# Patient Record
Sex: FEMALE | Race: WHITE | NOT HISPANIC OR LATINO | Employment: FULL TIME | URBAN - METROPOLITAN AREA
[De-identification: names, ages, dates, MRNs, and addresses within clinical notes are randomized per-mention and may not be internally consistent; named-entity substitution may affect disease eponyms.]

---

## 2024-09-06 ENCOUNTER — LAB REQUISITION (OUTPATIENT)
Dept: LAB | Facility: CLINIC | Age: 64
End: 2024-09-06

## 2024-09-06 LAB
PATH REPORT.COMMENTS IMP SPEC: NORMAL
PATH REPORT.FINAL DX SPEC: NORMAL
PATH REPORT.GROSS SPEC: NORMAL
PATH REPORT.MICROSCOPIC SPEC OTHER STN: NORMAL
PATH REPORT.RELEVANT HX SPEC: NORMAL
PATH REPORT.RELEVANT HX SPEC: NORMAL
PATH REPORT.SITE OF ORIGIN SPEC: NORMAL

## 2024-09-09 ENCOUNTER — PATIENT OUTREACH (OUTPATIENT)
Dept: ONCOLOGY | Facility: CLINIC | Age: 64
End: 2024-09-09
Payer: COMMERCIAL

## 2024-09-09 DIAGNOSIS — N85.02 EIN (ENDOMETRIAL INTRAEPITHELIAL NEOPLASIA): Primary | ICD-10-CM

## 2024-09-09 NOTE — PROGRESS NOTES
New Patient Hematology / Oncology Nurse Navigator Note     Referral Date: 9/9/24    Referring provider: Dr MONSTER Garcia     Referring Clinic/Organization: [facility]      Referred to: Gynecology Oncology    Requested provider (if applicable): Dr. Henao    Evaluation for :      Clinical History (per Nurse review of records provided):    9/6/24 path report -- BOOKMARKED  9/9/24 telephone encounter with referring provider -- BOOKMARKED    Clinical Assessment / Barriers to Care (Per Nurse):    ALE - left generic voicemail with call back information       Records Location: Care Everywhere     Records Needed:     See Pre-Visit encounter from CSS team for additional details on records collection. Additional questions on records needed for initial consult can be sent to P ONC ADULT NEW PATIENT RECORDS [[id number]] with a copy to  CANCER CARE NURSE NAVIGATION [[id number]]. Please include diagnosis and urgency in subject line.    Additional testing needed prior to consult:     N/A    Referral updates and Plan:     Triage instructions updated and sent to NPS for completion, VM left for patient      Aspen Freeman, JESN, RN, PHN, OCN  Hematology/Oncology Nurse Navigator   [facility]   [phone]   [email or web address]

## 2024-09-24 NOTE — TELEPHONE ENCOUNTER
RECORDS STATUS - ALL OTHER DIAGNOSIS      RECORDS RECEIVED FROM: [facility]   DATE RECEIVED: 9/25   NOTES STATUS DETAILS   OFFICE NOTE from referring provider [facility] Dr. Gilmar Kendall: 6/27/24   OPERATIVE REPORT [facility] 8/30/24: Hysteroscopy    11/10/17: Colonoscopy   MEDICATION LIST [facility]   LABS     PATHOLOGY REPORTS [facility]  9/6/24: Path Consult - CASE FROM [place], [place] ([id number], OBTAINED 08/30/24)    [facility]  8/30/24: [id number]  2/26/1998: [id number]  6/13/1996: [id number]  10/25/1995: [id number]    ANYTHING RELATED TO DIAGNOSIS Epic/ 9/20/24   IMAGING (NEED IMAGES & REPORT)     ULTRASOUND PACS 4/4/24: [facility]

## 2024-09-27 ENCOUNTER — ONCOLOGY VISIT (OUTPATIENT)
Dept: ONCOLOGY | Facility: CLINIC | Age: 64
End: 2024-09-27
Attending: OBSTETRICS & GYNECOLOGY
Payer: COMMERCIAL

## 2024-09-27 ENCOUNTER — PRE VISIT (OUTPATIENT)
Dept: ONCOLOGY | Facility: CLINIC | Age: 64
End: 2024-09-27
Payer: COMMERCIAL

## 2024-09-27 VITALS
HEART RATE: 69 BPM | OXYGEN SATURATION: 99 % | SYSTOLIC BLOOD PRESSURE: 134 MMHG | RESPIRATION RATE: 16 BRPM | HEIGHT: 63 IN | WEIGHT: 284 LBS | BODY MASS INDEX: 50.32 KG/M2 | DIASTOLIC BLOOD PRESSURE: 91 MMHG | TEMPERATURE: 97.4 F

## 2024-09-27 DIAGNOSIS — N85.02 EIN (ENDOMETRIAL INTRAEPITHELIAL NEOPLASIA): ICD-10-CM

## 2024-09-27 PROCEDURE — 99213 OFFICE O/P EST LOW 20 MIN: CPT | Performed by: OBSTETRICS & GYNECOLOGY

## 2024-09-27 PROCEDURE — 93005 ELECTROCARDIOGRAM TRACING: CPT

## 2024-09-27 PROCEDURE — 99205 OFFICE O/P NEW HI 60 MIN: CPT | Performed by: OBSTETRICS & GYNECOLOGY

## 2024-09-27 PROCEDURE — 93010 ELECTROCARDIOGRAM REPORT: CPT | Performed by: INTERNAL MEDICINE

## 2024-09-27 RX ORDER — METRONIDAZOLE 500 MG/100ML
500 INJECTION, SOLUTION INTRAVENOUS
OUTPATIENT
Start: 2024-09-27

## 2024-09-27 RX ORDER — CEFAZOLIN SODIUM IN 0.9 % NACL 3 G/100 ML
3 INTRAVENOUS SOLUTION, PIGGYBACK (ML) INTRAVENOUS
OUTPATIENT
Start: 2024-09-27

## 2024-09-27 RX ORDER — CEFAZOLIN SODIUM IN 0.9 % NACL 3 G/100 ML
3 INTRAVENOUS SOLUTION, PIGGYBACK (ML) INTRAVENOUS SEE ADMIN INSTRUCTIONS
Status: DISCONTINUED | OUTPATIENT
Start: 2024-09-27 | End: 2024-09-27

## 2024-09-27 ASSESSMENT — PAIN SCALES - GENERAL: PAINLEVEL: NO PAIN (0)

## 2024-09-27 NOTE — LETTER
2024      Dayna Chaudhary  [address]  [facility] [address]      Dear Colleague,    Thank you for referring your patient, Dayna Chaudhary, to the [facility]. Please see a copy of my visit note below.    Gynecologic Oncology New Patient Consult    Referring provider:    Referred Self, MD  No address on file   RE: Dayna Chaudhary  : 1960  ROXANA: 2024      CC: Endometrial Intraepithelial Neoplasia    HPI: Ms Dayna Chaudhary is a 63 year old year old female who presents for consultation. She is here today alone.        Treatment History:  2024: PMB x 2 episodes.   2024: TVUS Stripe 8mm  2024: OBGYN visit. Attempted embx but had cervical stenosis.   24: D&C with EIN    As above, has had 2 small episodes of PMB, which was light spotting. Light cramping/fullness. Had a D&C (due to cervical stenosis) which showed EIN.   no other symptoms. Some weight gain over the past couple of years.     Has a history of DVT/PTE in  while on OCPs (and had an ankle fracture). Not on anticoagulation now.     Great functional status, no SOB with ambulation.       Past Medical History:   Diagnosis Date     Ankle fracture      DVT (deep venous thrombosis) (H)     in  at the time of ankle fracture. DVT/PE     EIN (endometrial intraepithelial neoplasia)      Obesity      Pulmonary embolism (H)     DVT/PE in  while on OCPS and had broken ankle       Past Surgical History:   Procedure Laterality Date     ANKLE ARTHROPLASTY       CONIZATION LEEP      multiple.     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    General: Alert and oriented, no acute distress  Psych: Mood stable. Linear speech, appropriate affect  CV: RRR  Lungs: CTA  GI: No distention. No masses. No hernia.   Lymph: No enlarge lymph nodes in neck or groin  : Normal external genitalia. Cervix small. Uterus difficult to assess given habitus. No obvious pelvic masses.   ECOG 0      Labs/Path:     Collection Date:          8/30/2024 10:26 CDT      Ordering Physician:       JAVAN LISA   Received Date:            8/30/2024 10:51 CDT      Accession Number:         [id number]                                          Surgical Pathology Final Report   Specimen Type:   A. Endometrium, biopsy   B. Endocervix, curettage       Final Diagnosis:   Final diagnosis by Dr. Smith:     A. Endometrium, biopsy -     - Atypical endometrial hyperplasia (Endometrial Intraepithelial Neoplasia,    EIN)   - Please see comment     B. Endocervix, curettage -     - Unremarkable ectocervical squamous mucosa   - Negative for dysplasia or malignancy     *  Report Electronically Signed By  *      Bao Torrez M.D.      09.06.2024 16:51     Assessment:    Dayna Chaudhary is a 63 year old woman with a new diagnosis of EIN.          Plan:     1.)   Endometrial intraepithelial Neoplasia " (EIN): Reviewed pathology and natural history of EIN. Discussed up to 40% risk of underlying malignancy with EIN. Discussed that gold standard is definitive hysterectomy. We also reviewed options for hormonal non surgical treatment. I recommend surgery including Robotic TLH/BSO/possible sentinel LND. Discussed surgical risks not limited to bleeding, infection, damage to surrounding organs, need for blood transfusions and ICU stay. Discussed role of intraoperative frozen section. Discussed that if a malignancy is encountered and she meets criteria for staging, I would attempt sentinel LND versus full lymphadenectomy.   Not eligible for megace/metformin study given DVT history.     -Dacia TLH/BSO/sentinel LND  -CBC/CMP/EKG today in clinic, no additional preop needed       2.)Genetic risk factors were assessed: final pathology pending        Total visit time today was 65 minutes, which included review of labs, personally reviewing images , reviewing outside records,  , face to face time with the patient,  Documentation, and ordering labs and procedures .       Yesica Henao MD    Department of Ob/Gyn and Women's Health  Division of Gynecologic Oncology  [facility]  Pager [phone]          Again, thank you for allowing me to participate in the care of your patient.        Sincerely,        Yesica Henao MD

## 2024-09-27 NOTE — PROGRESS NOTES
Gynecologic Oncology New Patient Consult    Referring provider:    Referred Self, MD  No address on file   RE: Dayna Chaudhary  : 1960  ROXANA: 2024      CC: Endometrial Intraepithelial Neoplasia    HPI: Ms Dayna Chaudhary is a 63 year old year old female who presents for consultation. She is here today alone.        Treatment History:  2024: PMB x 2 episodes.   2024: TVUS Stripe 8mm  2024: OBGYN visit. Attempted embx but had cervical stenosis.   24: D&C with EIN    As above, has had 2 small episodes of PMB, which was light spotting. Light cramping/fullness. Had a D&C (due to cervical stenosis) which showed EIN.   no other symptoms. Some weight gain over the past couple of years.     Has a history of DVT/PTE in  while on OCPs (and had an ankle fracture). Not on anticoagulation now.     Great functional status, no SOB with ambulation.       Past Medical History:   Diagnosis Date    Ankle fracture     DVT (deep venous thrombosis) (H)     in  at the time of ankle fracture. DVT/PE    EIN (endometrial intraepithelial neoplasia)     Obesity     Pulmonary embolism (H)     DVT/PE in  while on OCPS and had broken ankle       Past Surgical History:   Procedure Laterality Date    ANKLE ARTHROPLASTY      CONIZATION LEEP      multiple.    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    General: Alert and oriented, no acute distress  Psych: Mood stable. Linear speech, appropriate affect  CV: RRR  Lungs: CTA  GI: No distention. No masses. No hernia.   Lymph: No enlarge lymph nodes in neck or groin  : Normal external genitalia. Cervix small. Uterus difficult to assess given habitus. No obvious pelvic masses.   ECOG 0      Labs/Path:     Collection Date:          8/30/2024 10:26 CDT      Ordering Physician:       JAVAN LISA   Received Date:            8/30/2024 10:51 CDT      Accession Number:         [id number]                                          Surgical Pathology Final Report   Specimen Type:   A. Endometrium, biopsy   B. Endocervix, curettage       Final Diagnosis:   Final diagnosis by Dr. Smith:     A. Endometrium, biopsy -     - Atypical endometrial hyperplasia (Endometrial Intraepithelial Neoplasia,    EIN)   - Please see comment     B. Endocervix, curettage -     - Unremarkable ectocervical squamous mucosa   - Negative for dysplasia or malignancy     *  Report Electronically Signed By  *      Bao Torrez M.D.      09.06.2024 16:51     Assessment:    Dayna Chaudhary is a 63 year old woman with a new diagnosis of EIN.          Plan:     1.)   Endometrial intraepithelial Neoplasia (EIN): Reviewed pathology and natural history of EIN. Discussed up to 40% risk of underlying malignancy with EIN. Discussed that gold standard is definitive hysterectomy. We also reviewed options for hormonal non surgical treatment. I recommend surgery including " Robotic TLH/BSO/possible sentinel LND. Discussed surgical risks not limited to bleeding, infection, damage to surrounding organs, need for blood transfusions and ICU stay. Discussed role of intraoperative frozen section. Discussed that if a malignancy is encountered and she meets criteria for staging, I would attempt sentinel LND versus full lymphadenectomy.   Not eligible for megace/metformin study given DVT history.     -Dacia TLH/BSO/sentinel LND  -CBC/CMP/EKG today in clinic, no additional preop needed       2.)Genetic risk factors were assessed: final pathology pending        Total visit time today was 65 minutes, which included review of labs, personally reviewing images , reviewing outside records,  , face to face time with the patient,  Documentation, and ordering labs and procedures .       Yesica Henao MD    Department of Ob/Gyn and Women's Health  Division of Gynecologic Oncology  [facility]  Pager [phone]

## 2024-09-27 NOTE — NURSING NOTE
"Oncology Rooming Note    September 27, 2024 2:49 PM   Dayna Chaudhary is a 63 year old female who presents for:    Chief Complaint   Patient presents with    Oncology Clinic Visit     EIN (endometrial intraepithelial neoplasia)     Initial Vitals: BP (!) 134/91 (BP Location: Right arm, Patient Position: Sitting, Cuff Size: Adult Large)   Pulse 69   Temp 97.4  F (36.3  C) (Oral)   Resp 16   Ht 1.611 m (5' 3.43\")   Wt 128.8 kg (284 lb)   SpO2 99%   BMI 49.64 kg/m   Estimated body mass index is 49.64 kg/m  as calculated from the following:    Height as of this encounter: 1.611 m (5' 3.43\").    Weight as of this encounter: 128.8 kg (284 lb). Body surface area is 2.4 meters squared.  No Pain (0) Comment: Data Unavailable   No LMP recorded. Patient is postmenopausal.  Allergies reviewed: Yes  Medications reviewed: Yes    Medications: Medication refills not needed today.  Pharmacy name entered into [employer]: [id number] [address] AT [facility]    Frailty Screening:   Is the patient here for a new oncology consult visit in cancer care? 1. Yes. Over the past month, have you experienced difficulty or required a caregiver to assist with:   1. Balance, walking or general mobility (including any falls)? NO  2. Completion of self-care tasks such as bathing, dressing, toileting, grooming/hygiene?  NO  3. Concentration or memory that affects your daily life?  NO       Clinical concerns:  none      Maryanne Zarco"

## 2024-09-27 NOTE — NURSING NOTE
EKG was performed today per order written by Dr thompson.  Name and  verified with patient. Patient tolerated well without incident. File transmitted to chart.    Lashell Murguia LPN   on 2024 at 5:06 PM

## 2024-09-27 NOTE — NURSING NOTE
RN completed pre operative teaching      Reviewed:  What to expect with surgery  Incison care  Restrictions  Diet after  Symptoms of concern  Driving  Bowel care   Showering after  Pain management and expectations     RN answered all the patients questions to the best of her ability    Maxine Chaney RN

## 2024-09-30 LAB
ATRIAL RATE - MUSE: 66 BPM
DIASTOLIC BLOOD PRESSURE - MUSE: NORMAL MMHG
INTERPRETATION ECG - MUSE: NORMAL
P AXIS - MUSE: 58 DEGREES
PR INTERVAL - MUSE: 202 MS
QRS DURATION - MUSE: 94 MS
QT - MUSE: 394 MS
QTC - MUSE: 413 MS
R AXIS - MUSE: -1 DEGREES
SYSTOLIC BLOOD PRESSURE - MUSE: NORMAL MMHG
T AXIS - MUSE: 14 DEGREES
VENTRICULAR RATE- MUSE: 66 BPM

## 2024-10-03 ENCOUNTER — TELEPHONE (OUTPATIENT)
Dept: ONCOLOGY | Facility: CLINIC | Age: 64
End: 2024-10-03
Payer: COMMERCIAL

## 2024-10-03 NOTE — TELEPHONE ENCOUNTER
Spoke with patient    Patient is schedule for surgery with: Dr. Henao    Surgery Date: 11/7 (ok'd by Dr. Guzman to use block)     Location: [facility]    H&P: to be completed by: completed by surgeon will complete and/or update on day of surgery as needed    LABS will be completed at [facility] per patient    Post-op:  11/22, in-person visit,      Patient was informed that a hospital pre-op RN will call 2-3 days prior to surgery with arrival time and instructions: Yes     Patient aware times are subject to change up until day before surgery.     Patient questions/concerns:  Patient is asking if there are any restrictions on getting the flu & covid vaccine prior to procedure.         Patient will also need assistance with FMLA. Pt states that you can messge her through [employer].     Surgery packet was sent via US mail      Teri Gibson on 10/3/2024 at 11:22 AM

## 2024-10-10 ENCOUNTER — TELEPHONE (OUTPATIENT)
Dept: ONCOLOGY | Facility: CLINIC | Age: 64
End: 2024-10-10
Payer: COMMERCIAL

## 2024-10-10 NOTE — TELEPHONE ENCOUNTER
FMLA/STD forms received via [employer]ax from [employer].      Forms to be completed and put in folder for provider to approve.    Fax #:  [phone]  Claim: [id number]    Marii Calle

## 2024-10-10 NOTE — TELEPHONE ENCOUNTER
FMLA/STD forms filled out and put in providers folder for review and signature.      Marii Calle

## 2024-10-25 NOTE — TELEPHONE ENCOUNTER
FMLA/STD paperwork completed, checked for accuracy, signed and faxed to [employer] @ [phone]. A copy was made, sent to scanning and original mailed to patient at home address.    Successful transmission verified in Right Fax.    Conchita Reis

## 2024-10-29 ENCOUNTER — PATIENT OUTREACH (OUTPATIENT)
Dept: ONCOLOGY | Facility: CLINIC | Age: 64
End: 2024-10-29
Payer: COMMERCIAL

## 2024-10-29 NOTE — PROGRESS NOTES
Malorie from [facility] reached out     Malorie states that she needs CPT codes in order to process patients STD leave     Writer tried to explain to Malorie that they did not have access to CPT codes    Malorie was frustrated and stated that she did not have time for this and was going to simone it incomplete and send this back to Dr Henao's team     Writer explained that the clinic does paperwork all day long and have never had a claimed denied due to no CPT codes being on the paperwork     Malorie tried to verify the CPT code for the hysterectomy only and writer gently explained there were more then one code as patient is also having ovaries and fallopian tubes removed and potential lymph node biopsies. Writer does not want patient given less of a leave due to CPT codes not being correct     Malorie given billing/hospital number to better access the correct CPT code       Malorie was very frustrated and made it known she didn't have time for this     Writer apologized     Maxine Chaney RN

## 2024-11-06 ENCOUNTER — ANESTHESIA EVENT (OUTPATIENT)
Dept: SURGERY | Facility: CLINIC | Age: 64
End: 2024-11-06
Payer: COMMERCIAL

## 2024-11-07 ENCOUNTER — HOSPITAL ENCOUNTER (OUTPATIENT)
Facility: CLINIC | Age: 64
Discharge: HOME | End: 2024-11-07
Attending: OBSTETRICS & GYNECOLOGY | Admitting: OBSTETRICS & GYNECOLOGY
Payer: COMMERCIAL

## 2024-11-07 ENCOUNTER — ANESTHESIA (OUTPATIENT)
Dept: SURGERY | Facility: CLINIC | Age: 64
End: 2024-11-07
Payer: COMMERCIAL

## 2024-11-07 VITALS
DIASTOLIC BLOOD PRESSURE: 68 MMHG | SYSTOLIC BLOOD PRESSURE: 114 MMHG | HEIGHT: 63 IN | OXYGEN SATURATION: 97 % | WEIGHT: 280.87 LBS | TEMPERATURE: 97.4 F | HEART RATE: 66 BPM | BODY MASS INDEX: 49.77 KG/M2 | RESPIRATION RATE: 18 BRPM

## 2024-11-07 DIAGNOSIS — G89.18 POSTOPERATIVE PAIN: Primary | ICD-10-CM

## 2024-11-07 DIAGNOSIS — Z98.890 POST-OPERATIVE STATE: ICD-10-CM

## 2024-11-07 DIAGNOSIS — Z86.718 HISTORY OF DVT (DEEP VEIN THROMBOSIS): ICD-10-CM

## 2024-11-07 LAB
ABO/RH(D): NORMAL
ANTIBODY SCREEN: NEGATIVE
SPECIMEN EXPIRATION DATE: NORMAL

## 2024-11-07 PROCEDURE — 250N000011 HC RX IP 250 OP 636: Mod: JZ | Performed by: OBSTETRICS & GYNECOLOGY

## 2024-11-07 PROCEDURE — 258N000003 HC RX IP 258 OP 636: Performed by: STUDENT IN AN ORGANIZED HEALTH CARE EDUCATION/TRAINING PROGRAM

## 2024-11-07 PROCEDURE — 272N000001 HC OR GENERAL SUPPLY STERILE: Performed by: OBSTETRICS & GYNECOLOGY

## 2024-11-07 PROCEDURE — 250N000009 HC RX 250: Performed by: OBSTETRICS & GYNECOLOGY

## 2024-11-07 PROCEDURE — 360N000080 HC SURGERY LEVEL 7, PER MIN: Performed by: OBSTETRICS & GYNECOLOGY

## 2024-11-07 PROCEDURE — 250N000011 HC RX IP 250 OP 636: Performed by: NURSE ANESTHETIST, CERTIFIED REGISTERED

## 2024-11-07 PROCEDURE — 250N000009 HC RX 250: Performed by: NURSE ANESTHETIST, CERTIFIED REGISTERED

## 2024-11-07 PROCEDURE — 88331 PATH CONSLTJ SURG 1 BLK 1SPC: CPT | Mod: 26 | Performed by: PATHOLOGY

## 2024-11-07 PROCEDURE — 999N000141 HC STATISTIC PRE-PROCEDURE NURSING ASSESSMENT: Performed by: OBSTETRICS & GYNECOLOGY

## 2024-11-07 PROCEDURE — 258N000003 HC RX IP 258 OP 636: Performed by: REGISTERED NURSE

## 2024-11-07 PROCEDURE — 370N000017 HC ANESTHESIA TECHNICAL FEE, PER MIN: Performed by: OBSTETRICS & GYNECOLOGY

## 2024-11-07 PROCEDURE — 86901 BLOOD TYPING SEROLOGIC RH(D): CPT | Performed by: OBSTETRICS & GYNECOLOGY

## 2024-11-07 PROCEDURE — 250N000009 HC RX 250: Performed by: REGISTERED NURSE

## 2024-11-07 PROCEDURE — 250N000011 HC RX IP 250 OP 636

## 2024-11-07 PROCEDURE — 250N000013 HC RX MED GY IP 250 OP 250 PS 637: Performed by: STUDENT IN AN ORGANIZED HEALTH CARE EDUCATION/TRAINING PROGRAM

## 2024-11-07 PROCEDURE — 88331 PATH CONSLTJ SURG 1 BLK 1SPC: CPT | Mod: TC | Performed by: OBSTETRICS & GYNECOLOGY

## 2024-11-07 PROCEDURE — 88342 IMHCHEM/IMCYTCHM 1ST ANTB: CPT | Mod: 26 | Performed by: PATHOLOGY

## 2024-11-07 PROCEDURE — 88309 TISSUE EXAM BY PATHOLOGIST: CPT | Mod: 26 | Performed by: PATHOLOGY

## 2024-11-07 PROCEDURE — 710N000009 HC RECOVERY PHASE 1, LEVEL 1, PER MIN: Performed by: OBSTETRICS & GYNECOLOGY

## 2024-11-07 PROCEDURE — 250N000011 HC RX IP 250 OP 636: Performed by: OBSTETRICS & GYNECOLOGY

## 2024-11-07 PROCEDURE — 88341 IMHCHEM/IMCYTCHM EA ADD ANTB: CPT | Mod: 26 | Performed by: PATHOLOGY

## 2024-11-07 PROCEDURE — 250N000011 HC RX IP 250 OP 636: Performed by: ANESTHESIOLOGY

## 2024-11-07 PROCEDURE — 86900 BLOOD TYPING SEROLOGIC ABO: CPT | Performed by: OBSTETRICS & GYNECOLOGY

## 2024-11-07 PROCEDURE — 710N000012 HC RECOVERY PHASE 2, PER MINUTE: Performed by: OBSTETRICS & GYNECOLOGY

## 2024-11-07 PROCEDURE — 250N000009 HC RX 250: Mod: JZ | Performed by: ANESTHESIOLOGY

## 2024-11-07 PROCEDURE — 250N000013 HC RX MED GY IP 250 OP 250 PS 637

## 2024-11-07 PROCEDURE — 250N000011 HC RX IP 250 OP 636: Performed by: REGISTERED NURSE

## 2024-11-07 PROCEDURE — 250N000011 HC RX IP 250 OP 636: Performed by: STUDENT IN AN ORGANIZED HEALTH CARE EDUCATION/TRAINING PROGRAM

## 2024-11-07 PROCEDURE — 88307 TISSUE EXAM BY PATHOLOGIST: CPT | Mod: 26 | Performed by: PATHOLOGY

## 2024-11-07 RX ORDER — OXYCODONE HYDROCHLORIDE 5 MG/1
5 TABLET ORAL
Status: COMPLETED | OUTPATIENT
Start: 2024-11-07 | End: 2024-11-07

## 2024-11-07 RX ORDER — ONDANSETRON 4 MG/1
4 TABLET, ORALLY DISINTEGRATING ORAL EVERY 8 HOURS PRN
Qty: 4 TABLET | Refills: 0 | Status: SHIPPED | OUTPATIENT
Start: 2024-11-07

## 2024-11-07 RX ORDER — DEXAMETHASONE SODIUM PHOSPHATE 10 MG/ML
INJECTION, SOLUTION INTRAMUSCULAR; INTRAVENOUS
Status: COMPLETED | OUTPATIENT
Start: 2024-11-07 | End: 2024-11-07

## 2024-11-07 RX ORDER — PROPOFOL 10 MG/ML
INJECTION, EMULSION INTRAVENOUS CONTINUOUS PRN
Status: DISCONTINUED | OUTPATIENT
Start: 2024-11-07 | End: 2024-11-07

## 2024-11-07 RX ORDER — ACETAMINOPHEN 325 MG/1
975 TABLET ORAL ONCE
Status: COMPLETED | OUTPATIENT
Start: 2024-11-07 | End: 2024-11-07

## 2024-11-07 RX ORDER — PROPOFOL 10 MG/ML
INJECTION, EMULSION INTRAVENOUS PRN
Status: DISCONTINUED | OUTPATIENT
Start: 2024-11-07 | End: 2024-11-07

## 2024-11-07 RX ORDER — OXYCODONE HYDROCHLORIDE 10 MG/1
10 TABLET ORAL
Status: DISCONTINUED | OUTPATIENT
Start: 2024-11-07 | End: 2024-11-07 | Stop reason: HOSPADM

## 2024-11-07 RX ORDER — ACETAMINOPHEN 325 MG/1
975 TABLET ORAL ONCE
Status: DISCONTINUED | OUTPATIENT
Start: 2024-11-07 | End: 2024-11-07 | Stop reason: HOSPADM

## 2024-11-07 RX ORDER — OXYCODONE HYDROCHLORIDE 5 MG/1
5 TABLET ORAL EVERY 6 HOURS PRN
Qty: 5 TABLET | Refills: 0 | Status: SHIPPED | OUTPATIENT
Start: 2024-11-07 | End: 2024-11-10

## 2024-11-07 RX ORDER — NALOXONE HYDROCHLORIDE 0.4 MG/ML
0.1 INJECTION, SOLUTION INTRAMUSCULAR; INTRAVENOUS; SUBCUTANEOUS
Status: DISCONTINUED | OUTPATIENT
Start: 2024-11-07 | End: 2024-11-07 | Stop reason: HOSPADM

## 2024-11-07 RX ORDER — BUPIVACAINE HYDROCHLORIDE AND EPINEPHRINE 2.5; 5 MG/ML; UG/ML
INJECTION, SOLUTION INFILTRATION; PERINEURAL
Status: COMPLETED | OUTPATIENT
Start: 2024-11-07 | End: 2024-11-07

## 2024-11-07 RX ORDER — NALOXONE HYDROCHLORIDE 0.4 MG/ML
0.2 INJECTION, SOLUTION INTRAMUSCULAR; INTRAVENOUS; SUBCUTANEOUS
Status: DISCONTINUED | OUTPATIENT
Start: 2024-11-07 | End: 2024-11-07 | Stop reason: HOSPADM

## 2024-11-07 RX ORDER — FLUMAZENIL 0.1 MG/ML
0.2 INJECTION, SOLUTION INTRAVENOUS
Status: DISCONTINUED | OUTPATIENT
Start: 2024-11-07 | End: 2024-11-07 | Stop reason: HOSPADM

## 2024-11-07 RX ORDER — DEXMEDETOMIDINE HYDROCHLORIDE 4 UG/ML
INJECTION, SOLUTION INTRAVENOUS
Status: COMPLETED | OUTPATIENT
Start: 2024-11-07 | End: 2024-11-07

## 2024-11-07 RX ORDER — FENTANYL CITRATE 50 UG/ML
25-50 INJECTION, SOLUTION INTRAMUSCULAR; INTRAVENOUS
Status: DISCONTINUED | OUTPATIENT
Start: 2024-11-07 | End: 2024-11-07 | Stop reason: HOSPADM

## 2024-11-07 RX ORDER — SODIUM CHLORIDE, SODIUM LACTATE, POTASSIUM CHLORIDE, CALCIUM CHLORIDE 600; 310; 30; 20 MG/100ML; MG/100ML; MG/100ML; MG/100ML
INJECTION, SOLUTION INTRAVENOUS CONTINUOUS PRN
Status: DISCONTINUED | OUTPATIENT
Start: 2024-11-07 | End: 2024-11-07

## 2024-11-07 RX ORDER — IBUPROFEN 600 MG/1
600 TABLET, FILM COATED ORAL EVERY 6 HOURS PRN
Qty: 12 TABLET | Refills: 0 | Status: SHIPPED | OUTPATIENT
Start: 2024-11-07

## 2024-11-07 RX ORDER — FENTANYL CITRATE 50 UG/ML
50 INJECTION, SOLUTION INTRAMUSCULAR; INTRAVENOUS EVERY 5 MIN PRN
Status: DISCONTINUED | OUTPATIENT
Start: 2024-11-07 | End: 2024-11-07 | Stop reason: HOSPADM

## 2024-11-07 RX ORDER — NALOXONE HYDROCHLORIDE 0.4 MG/ML
0.4 INJECTION, SOLUTION INTRAMUSCULAR; INTRAVENOUS; SUBCUTANEOUS
Status: DISCONTINUED | OUTPATIENT
Start: 2024-11-07 | End: 2024-11-07 | Stop reason: HOSPADM

## 2024-11-07 RX ORDER — METRONIDAZOLE 500 MG/100ML
500 INJECTION, SOLUTION INTRAVENOUS
Status: DISCONTINUED | OUTPATIENT
Start: 2024-11-07 | End: 2024-11-07 | Stop reason: HOSPADM

## 2024-11-07 RX ORDER — KETOROLAC TROMETHAMINE 30 MG/ML
INJECTION, SOLUTION INTRAMUSCULAR; INTRAVENOUS PRN
Status: DISCONTINUED | OUTPATIENT
Start: 2024-11-07 | End: 2024-11-07

## 2024-11-07 RX ORDER — ONDANSETRON 2 MG/ML
4 INJECTION INTRAMUSCULAR; INTRAVENOUS EVERY 30 MIN PRN
Status: DISCONTINUED | OUTPATIENT
Start: 2024-11-07 | End: 2024-11-07 | Stop reason: HOSPADM

## 2024-11-07 RX ORDER — LIDOCAINE HYDROCHLORIDE 20 MG/ML
INJECTION, SOLUTION INFILTRATION; PERINEURAL PRN
Status: DISCONTINUED | OUTPATIENT
Start: 2024-11-07 | End: 2024-11-07

## 2024-11-07 RX ORDER — ONDANSETRON 4 MG/1
4 TABLET, ORALLY DISINTEGRATING ORAL EVERY 30 MIN PRN
Status: DISCONTINUED | OUTPATIENT
Start: 2024-11-07 | End: 2024-11-07 | Stop reason: HOSPADM

## 2024-11-07 RX ORDER — IBUPROFEN 400 MG/1
800 TABLET, FILM COATED ORAL ONCE
Status: CANCELLED | OUTPATIENT
Start: 2024-11-07 | End: 2024-11-07

## 2024-11-07 RX ORDER — DEXAMETHASONE SODIUM PHOSPHATE 4 MG/ML
4 INJECTION, SOLUTION INTRA-ARTICULAR; INTRALESIONAL; INTRAMUSCULAR; INTRAVENOUS; SOFT TISSUE
Status: DISCONTINUED | OUTPATIENT
Start: 2024-11-07 | End: 2024-11-07 | Stop reason: HOSPADM

## 2024-11-07 RX ORDER — HYDROMORPHONE HYDROCHLORIDE 1 MG/ML
0.4 INJECTION, SOLUTION INTRAMUSCULAR; INTRAVENOUS; SUBCUTANEOUS EVERY 5 MIN PRN
Status: DISCONTINUED | OUTPATIENT
Start: 2024-11-07 | End: 2024-11-07 | Stop reason: HOSPADM

## 2024-11-07 RX ORDER — CEFAZOLIN SODIUM/WATER 3 G/30 ML
3 SYRINGE (ML) INTRAVENOUS
Status: COMPLETED | OUTPATIENT
Start: 2024-11-07 | End: 2024-11-07

## 2024-11-07 RX ORDER — ACETAMINOPHEN 325 MG/1
650 TABLET ORAL EVERY 6 HOURS PRN
Qty: 24 TABLET | Refills: 0 | Status: SHIPPED | OUTPATIENT
Start: 2024-11-07

## 2024-11-07 RX ORDER — FENTANYL CITRATE 50 UG/ML
25 INJECTION, SOLUTION INTRAMUSCULAR; INTRAVENOUS EVERY 5 MIN PRN
Status: DISCONTINUED | OUTPATIENT
Start: 2024-11-07 | End: 2024-11-07 | Stop reason: HOSPADM

## 2024-11-07 RX ORDER — HYDROMORPHONE HYDROCHLORIDE 1 MG/ML
0.2 INJECTION, SOLUTION INTRAMUSCULAR; INTRAVENOUS; SUBCUTANEOUS EVERY 5 MIN PRN
Status: DISCONTINUED | OUTPATIENT
Start: 2024-11-07 | End: 2024-11-07 | Stop reason: HOSPADM

## 2024-11-07 RX ORDER — AMOXICILLIN 250 MG
1-2 CAPSULE ORAL 2 TIMES DAILY
Qty: 30 TABLET | Refills: 0 | Status: SHIPPED | OUTPATIENT
Start: 2024-11-07

## 2024-11-07 RX ORDER — BUPIVACAINE HYDROCHLORIDE 2.5 MG/ML
INJECTION, SOLUTION EPIDURAL; INFILTRATION; INTRACAUDAL
Status: COMPLETED | OUTPATIENT
Start: 2024-11-07 | End: 2024-11-07

## 2024-11-07 RX ORDER — FENTANYL CITRATE 50 UG/ML
INJECTION, SOLUTION INTRAMUSCULAR; INTRAVENOUS PRN
Status: DISCONTINUED | OUTPATIENT
Start: 2024-11-07 | End: 2024-11-07

## 2024-11-07 RX ORDER — INDOCYANINE GREEN AND WATER 25 MG
KIT INJECTION PRN
Status: DISCONTINUED | OUTPATIENT
Start: 2024-11-07 | End: 2024-11-07 | Stop reason: HOSPADM

## 2024-11-07 RX ORDER — ONDANSETRON 2 MG/ML
INJECTION INTRAMUSCULAR; INTRAVENOUS PRN
Status: DISCONTINUED | OUTPATIENT
Start: 2024-11-07 | End: 2024-11-07

## 2024-11-07 RX ORDER — ENOXAPARIN SODIUM 100 MG/ML
40 INJECTION SUBCUTANEOUS DAILY
Qty: 2.8 ML | Refills: 0 | Status: SHIPPED | OUTPATIENT
Start: 2024-11-08 | End: 2024-11-15

## 2024-11-07 RX ORDER — DEXAMETHASONE SODIUM PHOSPHATE 4 MG/ML
INJECTION, SOLUTION INTRA-ARTICULAR; INTRALESIONAL; INTRAMUSCULAR; INTRAVENOUS; SOFT TISSUE PRN
Status: DISCONTINUED | OUTPATIENT
Start: 2024-11-07 | End: 2024-11-07

## 2024-11-07 RX ADMIN — MIDAZOLAM 1 MG: 1 INJECTION INTRAMUSCULAR; INTRAVENOUS at 11:00

## 2024-11-07 RX ADMIN — HYDROMORPHONE HYDROCHLORIDE 0.5 MG: 1 INJECTION, SOLUTION INTRAMUSCULAR; INTRAVENOUS; SUBCUTANEOUS at 15:06

## 2024-11-07 RX ADMIN — METRONIDAZOLE 500 MG: 500 INJECTION, SOLUTION INTRAVENOUS at 10:15

## 2024-11-07 RX ADMIN — FENTANYL CITRATE 25 MCG: 50 INJECTION, SOLUTION INTRAMUSCULAR; INTRAVENOUS at 16:40

## 2024-11-07 RX ADMIN — MIDAZOLAM 2 MG: 1 INJECTION INTRAMUSCULAR; INTRAVENOUS at 11:57

## 2024-11-07 RX ADMIN — PROPOFOL 50 MG: 10 INJECTION, EMULSION INTRAVENOUS at 15:40

## 2024-11-07 RX ADMIN — BUPIVACAINE HYDROCHLORIDE AND EPINEPHRINE BITARTRATE 40 ML: 2.5; .005 INJECTION, SOLUTION INFILTRATION; PERINEURAL at 11:00

## 2024-11-07 RX ADMIN — PHENYLEPHRINE HYDROCHLORIDE 100 MCG: 10 INJECTION INTRAVENOUS at 12:46

## 2024-11-07 RX ADMIN — DEXAMETHASONE SODIUM PHOSPHATE 2 MG: 10 INJECTION, SOLUTION INTRAMUSCULAR; INTRAVENOUS at 11:00

## 2024-11-07 RX ADMIN — DEXMEDETOMIDINE 40 MCG: 100 INJECTION, SOLUTION INTRAVENOUS at 11:00

## 2024-11-07 RX ADMIN — ONDANSETRON 4 MG: 2 INJECTION INTRAMUSCULAR; INTRAVENOUS at 15:06

## 2024-11-07 RX ADMIN — Medication 30 MG: at 12:36

## 2024-11-07 RX ADMIN — Medication 50 MG: at 12:05

## 2024-11-07 RX ADMIN — FENTANYL CITRATE 25 MCG: 50 INJECTION, SOLUTION INTRAMUSCULAR; INTRAVENOUS at 16:26

## 2024-11-07 RX ADMIN — Medication 20 MG: at 15:03

## 2024-11-07 RX ADMIN — PHENYLEPHRINE HYDROCHLORIDE 150 MCG: 10 INJECTION INTRAVENOUS at 12:33

## 2024-11-07 RX ADMIN — LIDOCAINE HYDROCHLORIDE 100 MG: 20 INJECTION, SOLUTION INFILTRATION; PERINEURAL at 12:04

## 2024-11-07 RX ADMIN — OXYCODONE HYDROCHLORIDE 5 MG: 5 TABLET ORAL at 16:24

## 2024-11-07 RX ADMIN — FENTANYL CITRATE 50 MCG: 50 INJECTION, SOLUTION INTRAMUSCULAR; INTRAVENOUS at 11:01

## 2024-11-07 RX ADMIN — PHENYLEPHRINE HYDROCHLORIDE 200 MCG: 10 INJECTION INTRAVENOUS at 12:49

## 2024-11-07 RX ADMIN — PROPOFOL 150 MCG/KG/MIN: 10 INJECTION, EMULSION INTRAVENOUS at 12:04

## 2024-11-07 RX ADMIN — CEFAZOLIN 3 G: 10 INJECTION, POWDER, FOR SOLUTION INTRAVENOUS at 12:20

## 2024-11-07 RX ADMIN — OXYCODONE HYDROCHLORIDE 5 MG: 5 TABLET ORAL at 18:26

## 2024-11-07 RX ADMIN — BUPIVACAINE HYDROCHLORIDE 20 ML: 2.5 INJECTION, SOLUTION EPIDURAL; INFILTRATION; INTRACAUDAL; PERINEURAL at 11:00

## 2024-11-07 RX ADMIN — PHENYLEPHRINE HYDROCHLORIDE 150 MCG: 10 INJECTION INTRAVENOUS at 15:29

## 2024-11-07 RX ADMIN — SODIUM CHLORIDE, POTASSIUM CHLORIDE, SODIUM LACTATE AND CALCIUM CHLORIDE: 600; 310; 30; 20 INJECTION, SOLUTION INTRAVENOUS at 11:57

## 2024-11-07 RX ADMIN — SODIUM CHLORIDE, POTASSIUM CHLORIDE, SODIUM LACTATE AND CALCIUM CHLORIDE: 600; 310; 30; 20 INJECTION, SOLUTION INTRAVENOUS at 14:19

## 2024-11-07 RX ADMIN — FENTANYL CITRATE 100 MCG: 50 INJECTION INTRAMUSCULAR; INTRAVENOUS at 12:04

## 2024-11-07 RX ADMIN — Medication 100 MG: at 15:47

## 2024-11-07 RX ADMIN — KETOROLAC TROMETHAMINE 15 MG: 30 INJECTION, SOLUTION INTRAMUSCULAR at 15:13

## 2024-11-07 RX ADMIN — PHENYLEPHRINE HYDROCHLORIDE 50 MCG: 10 INJECTION INTRAVENOUS at 12:30

## 2024-11-07 RX ADMIN — DEXAMETHASONE SODIUM PHOSPHATE 4 MG: 4 INJECTION, SOLUTION INTRA-ARTICULAR; INTRALESIONAL; INTRAMUSCULAR; INTRAVENOUS; SOFT TISSUE at 12:20

## 2024-11-07 RX ADMIN — Medication 30 MG: at 14:25

## 2024-11-07 RX ADMIN — ACETAMINOPHEN 975 MG: 325 TABLET, FILM COATED ORAL at 16:27

## 2024-11-07 RX ADMIN — FOSAPREPITANT 150 MG: 150 INJECTION, POWDER, LYOPHILIZED, FOR SOLUTION INTRAVENOUS at 11:21

## 2024-11-07 RX ADMIN — PROPOFOL 150 MG: 10 INJECTION, EMULSION INTRAVENOUS at 12:04

## 2024-11-07 RX ADMIN — Medication 100 MG: at 15:30

## 2024-11-07 RX ADMIN — Medication 20 MG: at 13:42

## 2024-11-07 ASSESSMENT — LIFESTYLE VARIABLES: TOBACCO_USE: 0

## 2024-11-07 ASSESSMENT — ACTIVITIES OF DAILY LIVING (ADL)
ADLS_ACUITY_SCORE: 0

## 2024-11-07 ASSESSMENT — COPD QUESTIONNAIRES: COPD: 0

## 2024-11-07 NOTE — ANESTHESIA CARE TRANSFER NOTE
Patient: Dayna Chaudhary    Procedure: Procedure(s):  HYSTERECTOMY, TOTAL, ROBOT-ASSISTED, WITH BILATERAL SALPINGO-OOPHORECTOMY, AND  BILATERAL sentinel lymph node biopsies       Diagnosis: EIN (endometrial intraepithelial neoplasia) [N85.02]  Diagnosis Additional Information: No value filed.    Anesthesia Type:   General     Note:    Oropharynx: oropharynx clear of all foreign objects and spontaneously breathing  Level of Consciousness: awake  Oxygen Supplementation: face mask  Level of Supplemental Oxygen (L/min / FiO2): 6  Independent Airway: airway patency satisfactory and stable  Dentition: dentition unchanged  Vital Signs Stable: post-procedure vital signs reviewed and stable  Report to RN Given: handoff report given  Patient transferred to: PACU    Handoff Report: Identifed the Patient, Identified the Reponsible Provider, Reviewed the pertinent medical history, Discussed the surgical course, Reviewed Intra-OP anesthesia mangement and issues during anesthesia, Set expectations for post-procedure period and Allowed opportunity for questions and acknowledgement of understanding  Vitals:  Vitals Value Taken Time   /57 11/07/24 1603   Temp 36.9  C (98.5  F) 11/07/24 1602   Pulse 68 11/07/24 1606   Resp 17 11/07/24 1606   SpO2 98 % 11/07/24 1606   Vitals shown include unfiled device data.    Electronically Signed By: LUKE Puente CRNA  November 7, 2024  4:07 PM

## 2024-11-07 NOTE — ANESTHESIA PREPROCEDURE EVALUATION
Anesthesia Pre-Procedure Evaluation    Patient: Dayna Chaudhary   MRN: [id number] : 1960        Procedure : Procedure(s):  HYSTERECTOMY, TOTAL, ROBOT-ASSISTED, WITH BILATERAL SALPINGO-OOPHORECTOMY, AND Possible BILATERAL sentinel lymph node biopsies          Past Medical History:   Diagnosis Date    Ankle fracture     Depression     DVT (deep venous thrombosis) (H)     in  at the time of ankle fracture. DVT/PE    EIN (endometrial intraepithelial neoplasia)     Obesity     PONV (postoperative nausea and vomiting)     Pulmonary embolism (H)     DVT/PE in  while on OCPS and had broken ankle      Past Surgical History:   Procedure Laterality Date    ANKLE ARTHROPLASTY      CONIZATION LEEP      multiple.    DILATION AND CURETTAGE        Allergies   Allergen Reactions    Bupropion Rash    Sulfa Antibiotics Rash      Social History     Tobacco Use    Smoking status: Never    Smokeless tobacco: Never   Substance Use Topics    Alcohol use: Not Currently     Comment: social drinker      Wt Readings from Last 1 Encounters:   24 128.8 kg (284 lb)        Anesthesia Evaluation   Pt has had prior anesthetic.     History of anesthetic complications  - PONV.      ROS/MED HX  ENT/Pulmonary:  - neg pulmonary ROS  (-) tobacco use, asthma and COPD   Neurologic:  - neg neurologic ROS  (-) no CVA and no TIA   Cardiovascular:     (+) Dyslipidemia hypertension- -   -  - -                                 Previous cardiac testing   Echo: Date: Results:    Stress Test:  Date: Results:    ECG Reviewed:  Date: 24 Results:  NSR  Cath:  Date: Results:      METS/Exercise Tolerance:     Hematologic:     (+) History of blood clots,    pt is not anticoagulated,           Musculoskeletal:   (+)  arthritis,             GI/Hepatic:    (-) liver disease   Renal/Genitourinary: Comment:  Endometrial intraepithelial Neoplasia (EIN)   (-) renal disease   Endo:     (+)               Obesity,    (-) Type II DM and thyroid  "disease   Psychiatric/Substance Use:     (+) psychiatric history depression       Infectious Disease:  - neg infectious disease ROS     Malignancy:       Other:            Physical Exam    Airway        Mallampati: II   TM distance: > 3 FB   Neck ROM: full   Mouth opening: > 3 cm    Respiratory Devices and Support         Dental       (+) Modest Abnormalities - crowns, retainers, 1 or 2 missing teeth    B=Bridge, C=Chipped, L=Loose, M=Missing    Cardiovascular   cardiovascular exam normal       Rhythm and rate: regular and normal     Pulmonary   pulmonary exam normal        breath sounds clear to auscultation           OUTSIDE LABS:  CBC: No results found for: \"WBC\", \"HGB\", \"HCT\", \"PLT\"  BMP: No results found for: \"NA\", \"POTASSIUM\", \"CHLORIDE\", \"CO2\", \"BUN\", \"CR\", \"GLC\"  COAGS: No results found for: \"PTT\", \"INR\", \"FIBR\"  POC: No results found for: \"BGM\", \"HCG\", \"HCGS\"  HEPATIC: No results found for: \"ALBUMIN\", \"PROTTOTAL\", \"ALT\", \"AST\", \"GGT\", \"ALKPHOS\", \"BILITOTAL\", \"BILIDIRECT\", \"BRAD\"  OTHER: No results found for: \"PH\", \"LACT\", \"A1C\", \"FELI\", \"PHOS\", \"MAG\", \"LIPASE\", \"AMYLASE\", \"TSH\", \"T4\", \"T3\", \"CRP\", \"SED\"    Labs (10/17/24)  WBC 3.78  Hgb 13.8  Hematocrit 40.9  Plt 284    Na 142  K 4.1  Cl 106  CO2 29  BUN 15   Cr 0.87  Gluc 101    Ca 9.5  Bili 0.5  Alk Phos 78  ALT 14  AST 24  Anesthesia Plan    ASA Status:  2    NPO Status:  NPO Appropriate    Anesthesia Type: General.     - Airway: ETT   Induction: Intravenous.   Maintenance: Balanced.   Techniques and Equipment:     - Lines/Monitors: BIS, 2nd IV     Consents    Anesthesia Plan(s) and associated risks, benefits, and realistic alternatives discussed. Questions answered and patient/representative(s) expressed understanding.     - Discussed:     - Discussed with:  Patient      - Extended Intubation/Ventilatory Support Discussed: No.      - Patient is DNR/DNI Status: No     Use of blood products discussed: Yes.     - Discussed with: Patient.     - Consented: " consented to blood products     Postoperative Care    Pain management: Multi-modal analgesia.   PONV prophylaxis: Ondansetron (or other 5HT-3), Dexamethasone or Solumedrol     Comments:               Ish Roe MD    I have reviewed the pertinent notes and labs in the chart from the past 30 days and (re)examined the patient.  Any updates or changes from those notes are reflected in this note.

## 2024-11-07 NOTE — ANESTHESIA PROCEDURE NOTES
Airway       Patient location during procedure: OR       Procedure Start/Stop Times: 11/7/2024 12:06 PM  Staff -        CRNA: Laura Thomas       Performed By: CRNA  Consent for Airway        Urgency: elective  Indications and Patient Condition       Indications for airway management: joey-procedural       Induction type:intravenous       Mask difficulty assessment: 1 - vent by mask    Final Airway Details       Final airway type: endotracheal airway       Successful airway: ETT - single  Endotracheal Airway Details        ETT size (mm): 7.0       Cuffed: yes       Cuff volume (mL): 6       Successful intubation technique: video laryngoscopy       VL Blade Size: MAC 3       Grade View of Cords: 1       Adjucts: stylet       Position: Right       Measured from: lips       Secured at (cm): 20    Post intubation assessment        Placement verified by: capnometry, equal breath sounds and chest rise        Number of attempts at approach: 1       Number of other approaches attempted: 0       Secured with: tape       Ease of procedure: easy       Dentition: Intact and Unchanged    Medication(s) Administered   Medication Administration Time: 11/7/2024 12:06 PM

## 2024-11-07 NOTE — ANESTHESIA POSTPROCEDURE EVALUATION
Patient: Dayna Chaudhary    Procedure: Procedure(s):  HYSTERECTOMY, TOTAL, ROBOT-ASSISTED, WITH BILATERAL SALPINGO-OOPHORECTOMY, AND  BILATERAL sentinel lymph node biopsies       Anesthesia Type:  General    Note:  Disposition: Outpatient   Postop Pain Control: Uneventful            Sign Out: Well controlled pain   PONV: No   Neuro/Psych: Uneventful            Sign Out: Acceptable/Baseline neuro status   Airway/Respiratory: Uneventful            Sign Out: Acceptable/Baseline resp. status   CV/Hemodynamics: Uneventful            Sign Out: Acceptable CV status; No obvious hypovolemia; No obvious fluid overload   Other NRE: NONE   DID A NON-ROUTINE EVENT OCCUR? No           Last vitals:  Vitals Value Taken Time   /60 11/07/24 1645   Temp 36.9  C (98.5  F) 11/07/24 1602   Pulse 65 11/07/24 1700   Resp 15 11/07/24 1700   SpO2 99 % 11/07/24 1700   Vitals shown include unfiled device data.    Electronically Signed By: Sigird Lucero MD  November 7, 2024  5:01 PM

## 2024-11-07 NOTE — OP NOTE
[facility] - Operative Note    Patient: Dayna Chaudhary  MRN: [id number]  : 1960    Date of Service:  2024    Preoperative Diagnosis:  - Endometrial intraepithelial neoplasia   - BMI 50    Postoperative Diagnosis:  - Endometrial intraepithelial neoplasia   - BMI 50    Procedure: Robotic-assisted total laparoscopic hysterectomy, bilateral salping-oophorectomy,  bilateral sentinel lymph node dissection    Surgeon: Yesica Henao MD    Assistants:  Kristen Garcia MD - Gynecology Oncology Fellow  Ena Carlisle MD PGY-4 - Resident   Merle Peter MD PGY-1 - Resident     Anesthesia: GETA    EBL: 20 mL  IVF: 1100 mL crystalloid  UOP: 220 mL clear urine at the end of the case  Drains:  None    Specimens:  ID Type Source Tests Collected by Time Destination   1 : Uterus, Cervix, Bilateral Fallopian Tubes & Ovaries Tissue Uterus, Cervix, Bilateral Fallopian Tubes & Ovaries SURGICAL PATHOLOGY EXAM Yesica Henao MD 2024  2:19 PM    2 : LEFT Sentinal Pelvic Lymph Node Tissue Lymph Node(s), Pelvis, Left SURGICAL PATHOLOGY EXAM Yesica Henao MD 2024  2:47 PM    3 : RIGHT Pelvic Sentinal Lymph Node Tissue Lymph Node(s), Pelvis, Right SURGICAL PATHOLOGY EXAM Yesica Henao MD 2024  2:55 PM        Complications: None apparent    Indications:  Dayna Chaudhary is a 63 year old with a history of abnormal pap smears with history of LEEP x2, cold knife cone x2 who presented to her OBGYN with postmenopausal bleeding. Due to cervical stenosis, she was taken to the OR for a D&C for endometrial sampling which came back as endometrial intraepithelial neoplasia of the uterus. She was referred to gynecology oncology for management. Recommendation was made for surgical excision with a total laparoscopic hysterectomy, bilateral salpingo-oophorectomy and possible sentinel lymph node dissection. The risks, benefits, and alternatives to the procedure were discussed and she  desired to proceed. Written informed consent signed prior to procedure.     Findings: EUA revealed atropic vaginal tissue consistent with post menopausal changes. Significant anterior and posterior vaginal wall prolapse. Cervix small, with a stenotic os and flush with upper vaginal wall, difficult to dilate.  Atraumatic entry, liver appeared consistent grossly with fatty liver disease, otherwise normal upper abdominal survey. Normal appearing uterus, fallopian tubes and ovaries bilaterally.     Procedure:  After obtaining informed consent, the patient was brought to the operating room where adequate general anesthesia was administered.  She was placed in the dorsal lithotomy position, and exam under anesthesia revealed the findings noted above. She was prepped and draped in the usual sterile fashion, and a Melvin catheter was placed. A surgical timeout was performed.  A sterile speculum was placed. The cervix was noted to be flush with the vaginal apex with a stenotic os, making the grasping with an tenaculum and subsequent dilation difficult. This was eventually achieved and a Vcare manipulator was placed under direct visualization via laparoscopy.     The speculum was removed. The umbilicus was everted with an Allis clamp. The Veress needle was placed, and intraperitoneal placement was suggested by the saline drop test and an opening pressure of less than 5mmHg.  The abdomen was then insufflated to a maximum pressure of 15mmHg. The Veress needle was removed, a joey-umbilical vertical 8 mm incision was made and an 8mm robotic trocar was placed.  The laparoscope was placed, and a survey of the abdomen revealed the findings noted above. No trauma from entry was noted. Under direct visualization, an 8 mm robotic and 12 mm airseal port were placed on the right and two 8 mm robotic ports were placed on the left. The patient was placed in steep Trendelenburg position and the robot was docked.   Attention was then turned  to the right round ligament, which was grasped and transected using monopolar scissors. The left round ligament was similarly transected. The retroperitoneal space was on each side and the ureters identified. At this point, the sentinel pelvic lymph nodes were identified.   An avascular area of the posterior broad ligament on each side was identified and incised with monopolar scissors. Bilateral infundibulopelvic ligaments were isolated, grasped and transected using both bipoloar and monopolar cautery with good hemostasis noted.The remaining portions of the anterior and posterior leaves of the broad ligament were then opened and carefully dissected down to the level of the uterine arteries bilaterally. This dissection was continued medially to develop the bladder flap. The bladder was gently dissected off the uterus and cervix until it was clear of the planned area of colpotomy. Bilateral uterine arteries were skeletonized, isolated and cauterized with the bipolar and transected. Serial bites along bilateral cardinal ligaments were completed.   The monopolar scissors were then used to further dissect the cardial ligament so that the ureter and vascular pedicle fell away from the cervix bilaterally. The monopolar scissors were then used to incise the vaginal tissue posteriorly and then extended circumferentially to complete the to colpotomy curcumfrentially. The uterus, cervix and bilateral fallopian tubes were then removed through the vagina.  The specimen was taken to pathology for frozen sampling.    Frozen pathology returned EIN, however given the size of the lesion, and gross appearance, I was concerned for the possibility of cancer on the final reports, I decided to proceed with bilateral sentinel lymph node removal.  The previously noted bilateral sentinel pelvic lymph nodes were dissected out using a combination of the cautery and gentle blunt dissection. They were both located along the mid portion of the  external iliac vein. Right and left pelvic lymph nodes were placed in separate specimen bags, removed from the vagina and sent to pathology.  A vaginal balloon was inserted to mantain adequate insufflation. The bladder was carefully retracted  off the anterior vaginal cuff. The vaginal cuff was closed with 0 V-loc suture in a running fashion. The vaginal cuff was hemostatic and the pelvis irrigated. Hemostasis of all surgical excision sites were noted.  All excision sites were noted to be hemostatic. All instruments were then removed from the abdomen and vagina.The robot was undocked, the abdomen was desufflated, and all ports were removed. All skin incisions were closed using 4-0 Vicryl, and covered with steri strips and sterile dressing.     All sponge, needle, and instrument counts were correct. The patient tolerated the procedure well and was transferred to recovery in stable condition.        Yesica Henao MD  Gynecologic Oncology  Pager [phone]

## 2024-11-07 NOTE — ANESTHESIA PROCEDURE NOTES
TAP (subcostal) Procedure Note    Pre-Procedure   Staff -        Anesthesiologist:  Tien Morgan MD       Resident/Fellow: Theodore Stafford MD       Performed By: resident       Location: pre-op       Procedure Start/Stop Times: 11/7/2024 11:00 AM       Pre-Anesthestic Checklist: patient identified, IV checked, site marked, risks and benefits discussed, informed consent, monitors and equipment checked, pre-op evaluation, at physician/surgeon's request and post-op pain management  Timeout:       Correct Patient: Yes        Correct Procedure: Yes        Correct Site: Yes        Correct Position: Yes        Correct Laterality: Yes        Site Marked: Yes  Procedure Documentation  Procedure: TAP (subcostal)       Laterality: bilateral       Patient Position: supine       Skin prep: Chloraprep       Needle Gauge: 21.        Needle Length (millimeters): 110        Ultrasound guided       1. Ultrasound was used to identify targeted nerve, plexus, vascular marker, or fascial plane and place a needle adjacent to it in real-time.       2. Ultrasound was used to visualize the spread of anesthetic in close proximity to the above referenced structure.       3. A permanent image is entered into the patient's record.       4. The visualized anatomic structures appeared normal.       5. There were no apparent abnormal pathologic findings.    Assessment/Narrative         The placement was negative for: blood aspirated, painful injection and site bleeding       Paresthesias: No.       Bolus given via needle..        Secured via.        Insertion/Infusion Method: Single Shot       Complications: none    Medication(s) Administered   Bupivacaine 0.25% PF (Infiltration) - Infiltration   20 mL - 11/7/2024 11:00:00 AM  Bupivacaine 0.25% w/ 1:200K Epi (Injection) - Injection   40 mL - 11/7/2024 11:00:00 AM  Dexmedetomidine 4 mcg/mL (Perineural) - Perineural   40 mcg - 11/7/2024 11:00:00 AM  Dexamethasone 10 mg/mL PF  "(Perineural) - Perineural   2 mg - 11/7/2024 11:00:00 AM  Medication Administration Time: 11/7/2024 11:00 AM      FOR [facility] ONLY:   Pain Team Contact information: please page the Pain Team Via [employer]. Search \"Pain\". During daytime hours, please page the attending first. At night please page the resident first.      "

## 2024-11-07 NOTE — H&P
[facility] History and Physical     Dayna Chaudhary MRN# [id number]   Age: 63 year old YOB: 1960      Date of Admission:                      2024     Primary care provider: Gogo Alvarez             Chief Complaint:   Presents for scheduled surgery           History of Present Illness:   This patient is a 63 year old female with EIN who presents with scheduled Dacia TLH/BSO/sentinel LND with past medical history significant for LIZY. MDD, Myopia, Presbyopia, Hx/o DVT, Hx/o PE, arthritis. She had a pre-operative H&P with Dr. Henao on 24, and reports no changes since then.           Cancer Treatment History:      2024: PMB x 2 episodes.   2024: TVUS Stripe 8mm  2024: OBGYN visit. Attempted embx but had cervical stenosis.   24: D&C with EIN     As above, has had 2 small episodes of PMB, which was light spotting. Light cramping/fullness. Had a D&C (due to cervical stenosis) which showed EIN.   no other symptoms. Some weight gain over the past couple of years.      Has a history of DVT/PTE in  while on OCPs (and had an ankle fracture). Not on anticoagulation now.      Great functional status, no SOB with ambulation         OBGYN history and Health Maintenance        Last Pap Smear: ASCUS HPV negative  Last Mammogram:Annually, last 2023   Last Colonoscopy: 2017           Past Medical History:      Past Medical History        Past Medical History:   Diagnosis Date    Ankle fracture     Depression      DVT (deep venous thrombosis) (H)       in  at the time of ankle fracture. DVT/PE    EIN (endometrial intraepithelial neoplasia)      Obesity      PONV (postoperative nausea and vomiting)      Pulmonary embolism (H)       DVT/PE in  while on OCPS and had broken ankle                 Past Surgical History:      Past Surgical History         Past Surgical History:   Procedure Laterality Date    ANKLE ARTHROPLASTY        CONIZATION LEEP         multiple.     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  Ethnicity identification: white  Preferred language: English  Lives at home with:   Two children in [place]          Family History:      The patient's family history is notable for:   Mom with pancreatic cancer (alcohol and tobacco use)  Dad with liver vs lung cancer (alcohol and tobacco use)  No siblings/kids with cancer       Immunizations:           Immunization History   Administered Date(s) Administered    COVID-19 12+ (Pfizer) 10/17/2023, 10/10/2024              Allergies:      Allergies        Allergies   Allergen Reactions    Bupropion Rash    Sulfa Antibiotics Rash                 Medications:      Current Facility-Administered Medications             Current Facility-Administered Medications   Medication Dose Route Frequency Provider Last Rate Last Admin    bupivacaine (MARCAINE) PF 0.25% + dexamethasone (DECADRON) PF 1 mg + dexmedetomidine (PRECEDEX) 20 mcg injection  20 mL Infiltration Once Theodore Stafford MD        ceFAZolin Sodium (ANCEF) injection 3 g  3 g Intravenous 30 Min Pre-Op Yesica Henao MD        fentaNYL (PF) (SUBLIMAZE) injection 25-50 mcg  25-50 mcg Intravenous Q2 Min PRN Theodore Stafford MD        flumazenil (ROMAZICON) injection 0.2 mg  0.2 mg Intravenous q1 min prn Theodore Stafford MD        fosaprepitant (EMEND) 150 mg in sodium chloride 0.9 % 275 mL intermittent infusion  150 mg Intravenous Once Ish Roe MD        metroNIDAZOLE (FLAGYL) infusion 500 mg  500 mg Intravenous 60 Min Pre-Op Yesica Henao MD        midazolam (VERSED) injection 1-2 mg  1-2 mg Intravenous Q4 Min PRN Theodore Stafford MD        naloxone (NARCAN) injection 0.2 mg  0.2 mg  Intravenous Q2 Min PRN Theodore Stafford MD         Or    naloxone (NARCAN) injection 0.4 mg  0.4 mg Intravenous Q2 Min PRN Theodore Stafford MD         Or    naloxone (NARCAN) injection 0.2 mg  0.2 mg Intramuscular Q2 Min PRN Theodore Stafford MD         Or    naloxone (NARCAN) injection 0.4 mg  0.4 mg Intramuscular Q2 Min PRN Theodore Stafford MD                     Review of Systems:       ROS: 10 point ROS neg other than the symptoms noted above in the HPI.          Physical Exam:   Vitals   There were no vitals filed for this visit.     General: Well appearing, NAD.  CV: HR regular. No murmur, rubs, or gallops  Resp: LS clear throughout. No resp distress  GI: Soft, nondistended, nontender  Extremities: 1+ edema BLE, stable per pt report          Data:   No results found for this or any previous visit (from the past 24 hours).  Labs/Path:      Collection Date:          8/30/2024 10:26 CDT      Ordering Physician:       JAVAN LISA   Received Date:            8/30/2024 10:51 CDT      Accession Number:         [id number]                                          Surgical Pathology Final Report   Specimen Type:   A. Endometrium, biopsy   B. Endocervix, curettage       Final Diagnosis:   Final diagnosis by Dr. Smith:     A. Endometrium, biopsy -     - Atypical endometrial hyperplasia (Endometrial Intraepithelial Neoplasia,    EIN)   - Please see comment     B. Endocervix, curettage -     - Unremarkable ectocervical squamous mucosa   - Negative for dysplasia or malignancy     *  Report Electronically Signed By  *      Bao Torrez M.D.      09.06.2024 16:51     Pre-op EKG 09/27  Impression:   Sinus rhythm   Normal ECG     CBC WITH PLTS/AUTO DIFF 10/17  Component  Ref Range & Units 3 wk ago   WBC  4.00 - 10.00 k/cmm 3.78 Low    RBC  3.90 - 5.20 m/cmm 4.59   Hgb  11.5 - 15.7 g/dL 13.8   Hematocrit  34.0 - 45.0 % 40.9   MCV  80.0 - 100.0 fL 89.1   MCH  25.0 - 32.0 pg 30.1   MCHC  31.0 - 36.0  g/dL 33.7   RDW  11.5 - 14.5 % 12.5   Plt  150 - 400 k/cmm 284   MPV  6.5 - 12.5 fL 8.7   Automated Abs Neutrophil  1.70 - 6.50 k/cmm 1.84   Comment: Preliminary ANC, Final Result to Follow   Abs Immature Granulocyte  0.00 - 0.09 k/cmm <0.04   Comment: The Immature Granulocyte Absolute count contains metamyelocytes and myelocytes.   Abs Neutrophil  1.70 - 6.50 k/cmm 1.84   Abs Lymphocyte  0.80 - 4.00 k/cmm 1.41   Abs Monocyte  0.20 - 1.00 k/cmm 0.42   Abs Eosinophil  0.00 - 0.60 k/cmm 0.08   Abs Basophil  0.00 - 0.20 k/cmm <0.04     CMP 10/17  Component  Ref Range & Units 3 wk ago   Sodium  135 - 148 mmol/L 142   Potassium  3.5 - 5.3 mmol/L 4.1   Chloride  92 - 108 mmol/L 106   CO2  22 - 30 mmol/L 29   Glucose  70 - 100 mg/dL 92   BUN  8 - 23 mg/dL 15   Creatinine  0.50 - 1.00 mg/dL 0.87   Calcium  8.8 - 10.2 mg/dL 9.5   Total Protein  6.4 - 8.3 g/dL 7.5   Albumin  3.8 - 5.1 g/dL 4.1   Bili Total  <=1.2 mg/dL 0.5   Alk Phos  35 - 104 IU/L 78   Comment: No reference range established for patients <18 years old.   ALT (SGPT)  <=33 IU/L 14   AST(SGOT)  5 - 40 IU/L 24   AnGap  8 - 16 mmol/L 7 Low    eGFR (2021 CKD-EPI)  >=60 ml/min/1.73m2 76          Assessment and Plan:   Assessment:   This patient is a 63 year old female with EIN who presents with scheduled Dacia TLH/BSO/sentinel LND with past medical history significant for LIZY. MDD, Myopia, Presbyopia, Hx/o DVT, Hx/o PE, arthritis. She had a pre-operative H&P with Dr. Henao on 09/27/24, and reports no changes since then.      Merle Peter MD  [facility]  Gynecology Oncology Resident, PGY-1  11/07/2024 9:40 AM    To reach the GYNECOLOGY ONCOLOGY team for this patient, please page [phone]  11/7/2024 9:40 AM

## 2024-11-07 NOTE — BRIEF OP NOTE
[facility]    Brief Operative Note    Pre-operative diagnosis: EIN (endometrial intraepithelial neoplasia) [N85.02]  Post-operative diagnosis Same as pre-operative diagnosis    Procedure: HYSTERECTOMY, TOTAL, ROBOT-ASSISTED, WITH BILATERAL SALPINGO-OOPHORECTOMY, AND  BILATERAL sentinel lymph node biopsies, Bilateral - Abdomen    Surgeon: Surgeons and Role:     * Yesica Henao MD - Primary     * Merle Peter MD - Resident - Assisting     * Ena Kong MD - Resident - Assisting     * Kristen Garcia MD - Fellow - Assisting  Anesthesia: General with Block   Estimated Blood Loss: 20 mL     Drains: None  Specimens:   ID Type Source Tests Collected by Time Destination   1 : Uterus, Cervix, Bilateral Fallopian Tubes & Ovaries Tissue Uterus, Cervix, Bilateral Fallopian Tubes & Ovaries SURGICAL PATHOLOGY EXAM Yesica Henao MD 11/7/2024  2:19 PM    2 : LEFT Sentinal Pelvic Lymph Node Tissue Lymph Node(s), Pelvis, Left SURGICAL PATHOLOGY EXAM Yesica Henao MD 11/7/2024  2:47 PM    3 : RIGHT Pelvic Sentinal Lymph Node Tissue Lymph Node(s), Pelvis, Right SURGICAL PATHOLOGY EXAM Yesica Henao MD 11/7/2024  2:55 PM      Findings:  Atropic vaginal tissue consistent with post menopausal changes. Cervix small, stenotic and flush with upper vaginal wall, difficult to dilate and unable to get uterine manipulator flush with cervix. Atraumatic entry, fatty liver consistent with fatty liver disease, otherwise normal upper abdominal survey. Normal appearing uterus, fallopian tubes and ovaries bilaterally. Notably large sigmoid colon. Hemostatic pedicles and intact vaginal cuff at the end of the case.     Complications: None.  Implants: * No implants in log *

## 2024-11-07 NOTE — OR NURSING
Regional bilateral abdominal block performed without complications.  VSS.  Pt tolerated well.  Will continue to monitor.

## 2024-11-07 NOTE — PROGRESS NOTES
Gynecologic Oncology Postoperative Check Note  11/7/2024    S: Patient reports she is doing well postoperatively. Pain is well controlled with PO pain medication. Ambulating without pain, lightheadedness, or dizziness. Voiding spontaneously. Tolerating crackers and water without nausea or vomiting. Denies chest pain, shortness of breath, dizziness, or other concerns at this time.     O:  Vitals:    11/07/24 1714 11/07/24 1715 11/07/24 1726 11/07/24 1900   BP:  (!) 140/77 129/71 114/68   BP Location:       Cuff Size:       Pulse:  71 66    Resp:  18 16 18   Temp:   97.4  F (36.3  C) 97.4  F (36.3  C)   TempSrc:   Oral Oral   SpO2: 96% 97% 96% 97%   Weight:       Height:           Gen: NAD  Cardio: RRR, S1/S2, no murmurs  Resp: CTAB, no wheezing or crackles  Abdomen: soft, appropriately tender, non-distended; 5 port site incisions c/d/I with overlying island dressing  Extremities: Non-tender, trace edema    A: 63 year old POD#0 s/p TLH, BSO, bilateral sLN. Doing well in the postoperative period, meeting all goals for discharge home.    Dz: EIN on frozen  FEN: Advance as tolerated  Pain: tylenol, ibuprofen, oxy PRN  GI: stool softners, zofran prn   : voiding spontaneously    Hx DVT/PE   While on OCPs and in the perioperative period after an ankle surgery.   - Plan for lovenox 7d postoperatively. Rx sent. Discussed with patient.     Dispo: To home    Macario Tapia MD, MPH, MS  Obstetrics, Gynecology & Women's Health   Resident, PGY-3  11/07/2024 7:23 PM

## 2024-11-08 NOTE — OR NURSING
Gynecology at bedside for discharge assessment. Gynecology cleared patient for discharge. Transport placed.

## 2024-11-08 NOTE — DISCHARGE INSTRUCTIONS
Contacting your Doctor -   To contact a doctor, call Dr Henao's clinic at [phone]  or:  [phone] and ask for the resident on call for Gynecology (answered 24 hours a day)   Emergency Department:  [facility]: [phone]  [facility]: [phone] 911 if you are in need of immediate or emergent help

## 2024-11-10 ENCOUNTER — HEALTH MAINTENANCE LETTER (OUTPATIENT)
Age: 64
End: 2024-11-10

## 2024-11-13 LAB
PATH REPORT.COMMENTS IMP SPEC: ABNORMAL
PATH REPORT.COMMENTS IMP SPEC: YES
PATH REPORT.FINAL DX SPEC: ABNORMAL
PATH REPORT.GROSS SPEC: ABNORMAL
PATH REPORT.INTRAOP OBS SPEC DOC: ABNORMAL
PATH REPORT.MICROSCOPIC SPEC OTHER STN: ABNORMAL
PATH REPORT.RELEVANT HX SPEC: ABNORMAL
PATHOLOGY SYNOPTIC REPORT: ABNORMAL
PHOTO IMAGE: ABNORMAL

## 2024-11-14 PROCEDURE — 81351 TP53 GENE FULL GENE SEQUENCE: CPT | Performed by: OBSTETRICS & GYNECOLOGY

## 2024-11-14 PROCEDURE — 81479 UNLISTED MOLECULAR PATHOLOGY: CPT | Performed by: OBSTETRICS & GYNECOLOGY

## 2024-11-14 PROCEDURE — 81403 MOPATH PROCEDURE LEVEL 4: CPT | Performed by: OBSTETRICS & GYNECOLOGY

## 2024-11-18 ENCOUNTER — TELEPHONE (OUTPATIENT)
Dept: SURGERY | Facility: CLINIC | Age: 64
End: 2024-11-18
Payer: COMMERCIAL

## 2024-11-18 NOTE — TELEPHONE ENCOUNTER
Called to review path  Endometrial cancer  Stage 1a gr 1  Also CIN3    She is doing well  Followup as scheduled       Yesica Henao MD  Gynecologic Oncology  Pager [phone]

## 2024-11-27 ENCOUNTER — TELEPHONE (OUTPATIENT)
Dept: ONCOLOGY | Facility: CLINIC | Age: 64
End: 2024-11-27
Payer: COMMERCIAL

## 2024-11-27 NOTE — TELEPHONE ENCOUNTER
Critical Illness forms received via [employer]     Forms to be completed and put in folder for provider to approve.    Fax #:  [phone]      Conchita Reis

## 2024-11-27 NOTE — TELEPHONE ENCOUNTER
Critical Illness forms filled out and put in providers folder for review and signature.      Conchita Reis

## 2024-12-09 NOTE — TELEPHONE ENCOUNTER
Critical Illness paperwork completed, checked for accuracy, signed and faxed to [employer] @ [phone]. A copy was made, sent to scanning and original emailed to patient at [email or web address].    Successful transmission verified in Right Fax.      Maxine Calle

## 2024-12-16 LAB
PATH REPORT.ADDENDUM SPEC: ABNORMAL
PATH REPORT.COMMENTS IMP SPEC: ABNORMAL
PATH REPORT.COMMENTS IMP SPEC: YES
PATH REPORT.FINAL DX SPEC: ABNORMAL
PATH REPORT.GROSS SPEC: ABNORMAL
PATH REPORT.INTRAOP OBS SPEC DOC: ABNORMAL
PATH REPORT.MICROSCOPIC SPEC OTHER STN: ABNORMAL
PATH REPORT.RELEVANT HX SPEC: ABNORMAL
PATHOLOGY SYNOPTIC REPORT: ABNORMAL
PHOTO IMAGE: ABNORMAL

## 2025-05-19 PROBLEM — C54.1 ENDOMETRIAL CANCER (H): Status: ACTIVE | Noted: 2025-05-19

## 2025-05-19 NOTE — PROGRESS NOTES
Follow Up Notes on Referred Patient    Date: 2025         RE: Dayna Chaudhary  : 1960  ROXANA: 2025      Dayna Chaudhary is a 64 year old woman with a Stage 1A grade 1 endometrial adenocarcinoma, CIN3 (cervix).   She is here today for a surveillance visit.     Oncology history:   10/95: NARINDER 1  3/22: Pap/HPV negative  : PMB x 2 episodes.   : TVUS Stripe 8mm  : OBGYN visit. Attempted embx but had cervical stenosis.   24: D&C with EIN  24: Robotic TLH/BSO/sentinel LND. Final pathology stage 1A grade 1 endometrial cancer. 3cm tumor. Non myoinvasive. MMRp.   A. Uterus, cervix, bilateral fallopian tubes and ovaries, total robot-assisted hysterectomy with bilateral salpingo-oophorectomy:  Endometrial adenocarcinoma, endometrioid type, FIGO grade 1, MMR-proficient   - Endometrial adenocarcinoma is limited to the endometrium   - Background endometrial intraepithelial neoplasia (EIN)   - Myometrial adenomyosis  - Unremarkable uterine serosa  - Cervix with high grade squamous intraepithelial lesion (cervical intraepithelial neoplasia 3, CIN3) involving the endocervical glands.  Ectocervical margin is negative for dysplasia  - LEFT ovary with benign cortical inclusion cysts and corpora albicantia   - RIGHT ovary with benign cortical inclusion cysts, corpora albicantia, and endosalpingiosis   - Bilateral fallopian tubes with paratubal cysts     B. Lymph node, LEFT pelvic, sentinel, biopsy:  - One lymph node, negative for metastatic carcinoma (0/1)     C. Lymph node, RIGHT pelvic, sentinel, biopsy:  - Six lymph nodes, negative for metastatic carcinoma (0/6)          Today she comes to clinic feeling well and denies any concerns for her visit. She denies any vaginal bleeding, no changes in her bowel or bladder habits, no nausea/emesis, no lower extremity edema, and no difficulties eating or sleeping. She denies any abdominal discomfort/bloating, no fevers or chills, and no  chest pain or shortness of breath. She does not recall being told she was HPV positive. She has had prior cervical cryo and LEEP procedures.        Annual exam with PCP:8/24  Mammogram:10/24  Colonoscopy: done age 60; due age 67      Review of Systems  See HPI      Past Medical History:    Past Medical History:   Diagnosis Date    Ankle fracture 2005    Depression     DVT (deep venous thrombosis) (H)     in 2005 at the time of ankle fracture. DVT/PE    EIN (endometrial intraepithelial neoplasia)     Obesity     PONV (postoperative nausea and vomiting)     Pulmonary embolism (H)     DVT/PE in 2005 while on OCPS and had broken ankle         Past Surgical History:    Past Surgical History:   Procedure Laterality Date    ANKLE ARTHROPLASTY      CONIZATION LEEP      multiple.    DAVINCI HYSTERECTOMY TOTAL, BILATERAL SALPINGO-OOPHORECTOMY, NODE DISSECTION, COMBINED Bilateral 11/7/2024    Procedure: HYSTERECTOMY, TOTAL, ROBOT-ASSISTED, WITH BILATERAL SALPINGO-OOPHORECTOMY, AND  BILATERAL sentinel lymph node biopsies;  Surgeon: Yesica Henao MD;  Location: [facility] OR    DILATION AND CURETTAGE         Current Medications:     Current Outpatient Medications   Medication Sig Dispense Refill    sertraline (ZOLOFT) 50 MG tablet Take 50 mg by mouth daily.      cetirizine (ZYRTEC) 10 MG tablet Take 10 mg by mouth as needed. (Patient not taking: Reported on 5/20/2025)           Allergies:        Allergies   Allergen Reactions    Bupropion Rash    Sulfa Antibiotics Rash        Social History:     Social History     Tobacco Use    Smoking status: Never    Smokeless tobacco: Never   Substance Use Topics    Alcohol use: Not Currently     Comment: social drinker       History   Drug Use Unknown         Family History:     The patient's family history is notable for     No family history on file.      Physical Exam:     /85 (BP Location: Right arm, Patient Position: Sitting, Cuff Size: Adult Large)   Pulse 65   Temp 97.9  F (36.6   C) (Oral)   Resp 16   Wt 128.1 kg (282 lb 4.8 oz)   SpO2 100%   BMI 50.01 kg/m    Body mass index is 50.01 kg/m .    General Appearance: healthy and alert, no distress     HEENT: no thyromegaly, no palpable nodules or masses        Cardiovascular: regular rate and rhythm, no gallops, rubs or murmurs     Respiratory: lungs clear, no rales, rhonchi or wheezes, normal diaphragmatic excursion    Musculoskeletal: extremities non tender and without edema    Skin: no lesions or rashes     Neurological: normal gait, no gross defects     Psychiatric: appropriate mood and affect                               Hematological: normal cervical, supraclavicular and inguinal lymph nodes     Gastrointestinal:       abdomen soft, non-tender, non-distended, no organomegaly or masses    Genitourinary: External genitalia and urethral meatus appears normal.  Vagina is smooth without nodularity or masses.  Cervix surgically absent.  Bimanual exam reveal no masses, nodularity or fullness.  Recto-vaginal exam confirms these findings.      Assessment:    Dayna Chaudhary is a 64 year old woman with a Stage 1A grade 1 endometrial adenocarcinoma, CIN3 (cervix).  She is here today for a surveillance visit.     24 minutes spent on the date of the encounter doing chart review, history and exam, documentation and further activities as noted above      Plan:     1.)       Patient to RTC in 6 months for her next surveillance visit. Reviewed recommendations from [facility] not to perform surveillance pap smears in women diagnosed with endometrial cancer as this does not improve detection of local recurrence. Reviewed signs and symptoms for when she should contact the clinic or seek additional care. Patient to contact the clinic with any questions or concerns in the interim.  Answered all of her questions to the best of my ability.    -CIN3 on final pathology: plan for annual cytology at next visit.      2.) Genetic risk factors were assessed and the  patient has intact MMR proteins.     3.) Labs and/or tests ordered include:  none.     4.) Health maintenance issues addressed today include annual health maintenance and non-gynecologic issues with PCP.    LUKE Khan, WHNP-BC, ANP-BC, AOCNP  Women's Health Nurse Practitioner  Adult Nurse Practitioner  Division of Gynecologic Oncology        CC  Patient Care Team:  Gogo Alvarez NP as PCP - General (Nurse Practitioner)  Yesica Henao MD as MD (Gynecologic Oncology)  Yesica Henao MD as Assigned Cancer Care Provider  SELF, REFERRED

## 2025-05-20 ENCOUNTER — ONCOLOGY VISIT (OUTPATIENT)
Dept: ONCOLOGY | Facility: CLINIC | Age: 65
End: 2025-05-20
Attending: NURSE PRACTITIONER
Payer: COMMERCIAL

## 2025-05-20 VITALS
WEIGHT: 282.3 LBS | HEART RATE: 65 BPM | TEMPERATURE: 97.9 F | BODY MASS INDEX: 50.01 KG/M2 | SYSTOLIC BLOOD PRESSURE: 133 MMHG | OXYGEN SATURATION: 100 % | RESPIRATION RATE: 16 BRPM | DIASTOLIC BLOOD PRESSURE: 85 MMHG

## 2025-05-20 DIAGNOSIS — C54.1 ENDOMETRIAL CANCER (H): Primary | ICD-10-CM

## 2025-05-20 DIAGNOSIS — D06.9 CIN III (CERVICAL INTRAEPITHELIAL NEOPLASIA III): ICD-10-CM

## 2025-05-20 PROCEDURE — 99214 OFFICE O/P EST MOD 30 MIN: CPT | Performed by: NURSE PRACTITIONER

## 2025-05-20 PROCEDURE — 99213 OFFICE O/P EST LOW 20 MIN: CPT | Performed by: NURSE PRACTITIONER

## 2025-05-20 RX ORDER — CETIRIZINE HYDROCHLORIDE 10 MG/1
10 TABLET ORAL PRN
COMMUNITY
Start: 2025-05-06

## 2025-05-20 ASSESSMENT — PAIN SCALES - GENERAL: PAINLEVEL_OUTOF10: NO PAIN (0)

## 2025-05-20 NOTE — LETTER
2025      Dyana Chaudhary  [address]  [facility] [address]      Dear Colleague,    Thank you for referring your patient, Dayna Chaudhary, to the [facility]. Please see a copy of my visit note below.                Follow Up Notes on Referred Patient    Date: 2025         RE: Dayna Chaudhary  : 1960  ROXANA: 2025      Dayna Chaudhary is a 64 year old woman with a Stage 1A grade 1 endometrial adenocarcinoma, CIN3 (cervix).   She is here today for a surveillance visit.     Oncology history:   10/95: NARINDER 1  3/22: Pap/HPV negative  : PMB x 2 episodes.   : TVUS Stripe 8mm  : OBGYN visit. Attempted embx but had cervical stenosis.   24: D&C with EIN  24: Robotic TLH/BSO/sentinel LND. Final pathology stage 1A grade 1 endometrial cancer. 3cm tumor. Non myoinvasive. MMRp.   A. Uterus, cervix, bilateral fallopian tubes and ovaries, total robot-assisted hysterectomy with bilateral salpingo-oophorectomy:  Endometrial adenocarcinoma, endometrioid type, FIGO grade 1, MMR-proficient   - Endometrial adenocarcinoma is limited to the endometrium   - Background endometrial intraepithelial neoplasia (EIN)   - Myometrial adenomyosis  - Unremarkable uterine serosa  - Cervix with high grade squamous intraepithelial lesion (cervical intraepithelial neoplasia 3, CIN3) involving the endocervical glands.  Ectocervical margin is negative for dysplasia  - LEFT ovary with benign cortical inclusion cysts and corpora albicantia   - RIGHT ovary with benign cortical inclusion cysts, corpora albicantia, and endosalpingiosis   - Bilateral fallopian tubes with paratubal cysts     B. Lymph node, LEFT pelvic, sentinel, biopsy:  - One lymph node, negative for metastatic carcinoma (0/1)     C. Lymph node, RIGHT pelvic, sentinel, biopsy:  - Six lymph nodes, negative for metastatic carcinoma (0/6)          Today she comes to clinic feeling well and denies any  concerns for her visit. She denies any vaginal bleeding, no changes in her bowel or bladder habits, no nausea/emesis, no lower extremity edema, and no difficulties eating or sleeping. She denies any abdominal discomfort/bloating, no fevers or chills, and no chest pain or shortness of breath. She does not recall being told she was HPV positive. She has had prior cervical cryo and LEEP procedures.        Annual exam with PCP:8/24  Mammogram:10/24  Colonoscopy: done age 60; due age 67      Review of Systems  See HPI      Past Medical History:    Past Medical History:   Diagnosis Date     Ankle fracture 2005     Depression      DVT (deep venous thrombosis) (H)     in 2005 at the time of ankle fracture. DVT/PE     EIN (endometrial intraepithelial neoplasia)      Obesity      PONV (postoperative nausea and vomiting)      Pulmonary embolism (H)     DVT/PE in 2005 while on OCPS and had broken ankle         Past Surgical History:    Past Surgical History:   Procedure Laterality Date     ANKLE ARTHROPLASTY       CONIZATION LEEP      multiple.     DAVINCI HYSTERECTOMY TOTAL, BILATERAL SALPINGO-OOPHORECTOMY, NODE DISSECTION, COMBINED Bilateral 11/7/2024    Procedure: HYSTERECTOMY, TOTAL, ROBOT-ASSISTED, WITH BILATERAL SALPINGO-OOPHORECTOMY, AND  BILATERAL sentinel lymph node biopsies;  Surgeon: Yesica Henao MD;  Location: [facility] OR     DILATION AND CURETTAGE         Current Medications:     Current Outpatient Medications   Medication Sig Dispense Refill     sertraline (ZOLOFT) 50 MG tablet Take 50 mg by mouth daily.       cetirizine (ZYRTEC) 10 MG tablet Take 10 mg by mouth as needed. (Patient not taking: Reported on 5/20/2025)           Allergies:        Allergies   Allergen Reactions     Bupropion Rash     Sulfa Antibiotics Rash        Social History:     Social History     Tobacco Use     Smoking status: Never     Smokeless tobacco: Never   Substance Use Topics     Alcohol use: Not Currently     Comment: social drinker        History   Drug Use Unknown         Family History:     The patient's family history is notable for     No family history on file.      Physical Exam:     /85 (BP Location: Right arm, Patient Position: Sitting, Cuff Size: Adult Large)   Pulse 65   Temp 97.9  F (36.6  C) (Oral)   Resp 16   Wt 128.1 kg (282 lb 4.8 oz)   SpO2 100%   BMI 50.01 kg/m    Body mass index is 50.01 kg/m .    General Appearance: healthy and alert, no distress     HEENT: no thyromegaly, no palpable nodules or masses        Cardiovascular: regular rate and rhythm, no gallops, rubs or murmurs     Respiratory: lungs clear, no rales, rhonchi or wheezes, normal diaphragmatic excursion    Musculoskeletal: extremities non tender and without edema    Skin: no lesions or rashes     Neurological: normal gait, no gross defects     Psychiatric: appropriate mood and affect                               Hematological: normal cervical, supraclavicular and inguinal lymph nodes     Gastrointestinal:       abdomen soft, non-tender, non-distended, no organomegaly or masses    Genitourinary: External genitalia and urethral meatus appears normal.  Vagina is smooth without nodularity or masses.  Cervix surgically absent.  Bimanual exam reveal no masses, nodularity or fullness.  Recto-vaginal exam confirms these findings.      Assessment:    Dayna Chaudhary is a 64 year old woman with a Stage 1A grade 1 endometrial adenocarcinoma, CIN3 (cervix).  She is here today for a surveillance visit.     24 minutes spent on the date of the encounter doing chart review, history and exam, documentation and further activities as noted above      Plan:     1.)       Patient to RTC in 6 months for her next surveillance visit. Reviewed recommendations from [facility] not to perform surveillance pap smears in women diagnosed with endometrial cancer as this does not improve detection of local recurrence. Reviewed signs and symptoms for when she should contact the clinic or  seek additional care. Patient to contact the clinic with any questions or concerns in the interim.  Answered all of her questions to the best of my ability.    -CIN3 on final pathology: plan for annual cytology at next visit.      2.) Genetic risk factors were assessed and the patient has intact MMR proteins.     3.) Labs and/or tests ordered include:  none.     4.) Health maintenance issues addressed today include annual health maintenance and non-gynecologic issues with PCP.    LUKE Khan, WHNP-BC, ANP-BC, AOCNP  Women's Health Nurse Practitioner  Adult Nurse Practitioner  Division of Gynecologic Oncology        CC  Patient Care Team:  Gogo Alvarez NP as PCP - General (Nurse Practitioner)  Yesica Henao MD as MD (Gynecologic Oncology)  Yesica Henao MD as Assigned Cancer Care Provider  SELF, REFERRED    Again, thank you for allowing me to participate in the care of your patient.        Sincerely,        LUKE Thompson CNP    Electronically signed

## 2025-05-20 NOTE — NURSING NOTE
"Oncology Rooming Note    May 20, 2025 8:52 AM   Dayna Chaudhary is a 64 year old female who presents for:    Chief Complaint   Patient presents with    Oncology Clinic Visit     Endometrial cancer     Initial Vitals: /85 (BP Location: Right arm, Patient Position: Sitting, Cuff Size: Adult Large)   Pulse 65   Temp 97.9  F (36.6  C) (Oral)   Resp 16   Wt 128.1 kg (282 lb 4.8 oz)   SpO2 100%   BMI 50.01 kg/m   Estimated body mass index is 50.01 kg/m  as calculated from the following:    Height as of 11/7/24: 1.6 m (5' 3\").    Weight as of this encounter: 128.1 kg (282 lb 4.8 oz). Body surface area is 2.39 meters squared.  No Pain (0) Comment: Data Unavailable   No LMP recorded. Patient is postmenopausal.  Allergies reviewed: Yes  Medications reviewed: Yes    Medications: Medication refills not needed today.  Pharm[facility] entered into [facility]: [id number] [place] [id number] AT [facility]    Frailty Screening:   Is the patient here for a new oncology consult visit in cancer care? 2. No    PHQ9:  Did this patient require a PHQ9?: No      Clinical concerns: none      Mingo Meng              "

## (undated) DEVICE — DAVINCI XI DRIVER NDL MEGA SUTURECUT 8MM EXT 471309

## (undated) DEVICE — NDL SPINAL 22GA 3.5" QUINCKE 405181

## (undated) DEVICE — SUCTION MANIFOLD NEPTUNE 2 SYS 4 PORT 0702-020-000

## (undated) DEVICE — DAVINCI XI SEAL UNIVERSAL 5-12MM 470500

## (undated) DEVICE — PREP DYNA-HEX 4% CHG SCRUB 4OZ BOTTLE MDS098710

## (undated) DEVICE — CATH TRAY FOLEY SURESTEP 16FR W/URNE MTR STLK LATEX A303316A

## (undated) DEVICE — PACK GOWN 3/PK DISP XL SBA32GPFCB

## (undated) DEVICE — SU WND CLOSURE VLOC 180 ABS 0 9" GS-21 VLOCL0346

## (undated) DEVICE — PREP CHLORAPREP 26ML TINTED HI-LITE ORANGE 930815

## (undated) DEVICE — SUCTION IRR STRYKERFLOW II W/TIP 250-070-520

## (undated) DEVICE — DAVINCI HOT SHEARS TIP COVER  400180

## (undated) DEVICE — ANTIFOG SOLUTION SEE SHARP 150M TROCAR SWABS 30978 (COI)

## (undated) DEVICE — DRAPE SHEET REV FOLD 3/4 9349

## (undated) DEVICE — DAVINCI XI OBTURATOR BLADELESS 8MM 470359

## (undated) DEVICE — DAVINCI XI DRAPE ARM 470015

## (undated) DEVICE — ENDO POUCH UNIVERSAL RETRIEVAL SYSTEM INZII 5MM CD003

## (undated) DEVICE — KOH COLPOTOMIZER OCCLUDER  CPO-6

## (undated) DEVICE — RETR ELEV / UTERINE MANIPULATOR V-CARE MED CUP 60-6085-201A

## (undated) DEVICE — LINEN TOWEL PACK X6 WHITE 5487

## (undated) DEVICE — ESU PENCIL W/COATED BLADE E2450H

## (undated) DEVICE — KIT PATIENT POSITIONING PIGAZZI LATEX FREE 40580

## (undated) DEVICE — SU VICRYL 4-0 PS-2 18" UND J496H

## (undated) DEVICE — NDL INSUFFLATION 13GA 120MM C2201

## (undated) DEVICE — DAVINCI XI GRASPER PROGRASP 8MM EXT 471093

## (undated) DEVICE — Device

## (undated) DEVICE — ENDO TROCAR CONMED AIRSEAL BLADELESS 08X120MM IAS8-120LP

## (undated) DEVICE — DAVINCI XI MONOPOLAR SCISSORS HOT SHEARS 8MM 470179

## (undated) DEVICE — LINEN TOWEL PACK X30 5481

## (undated) DEVICE — DAVINCI XI ESU BIPOLAR 3MM ENDOWRIST FENESTRATED EXT 471205

## (undated) DEVICE — TUBING FILTER TRI-LUMEN AIRSEAL ASC-EVAC1

## (undated) DEVICE — DAVINCI XI DRAPE COLUMN 470341

## (undated) DEVICE — PROTECTOR ARM ONE-STEP TRENDELENBURG 40418 (COI)

## (undated) RX ORDER — INDOCYANINE GREEN AND WATER 25 MG
KIT INJECTION
Status: DISPENSED
Start: 2024-11-07

## (undated) RX ORDER — KETOROLAC TROMETHAMINE 30 MG/ML
INJECTION, SOLUTION INTRAMUSCULAR; INTRAVENOUS
Status: DISPENSED
Start: 2024-11-07

## (undated) RX ORDER — METRONIDAZOLE 500 MG/100ML
INJECTION, SOLUTION INTRAVENOUS
Status: DISPENSED
Start: 2024-11-07

## (undated) RX ORDER — HYDROMORPHONE HYDROCHLORIDE 1 MG/ML
INJECTION, SOLUTION INTRAMUSCULAR; INTRAVENOUS; SUBCUTANEOUS
Status: DISPENSED
Start: 2024-11-07

## (undated) RX ORDER — FENTANYL CITRATE 50 UG/ML
INJECTION, SOLUTION INTRAMUSCULAR; INTRAVENOUS
Status: DISPENSED
Start: 2024-11-07

## (undated) RX ORDER — PROPOFOL 10 MG/ML
INJECTION, EMULSION INTRAVENOUS
Status: DISPENSED
Start: 2024-11-07

## (undated) RX ORDER — CEFAZOLIN SODIUM/WATER 2 G/20 ML
SYRINGE (ML) INTRAVENOUS
Status: DISPENSED
Start: 2024-11-07

## (undated) RX ORDER — CEFAZOLIN SODIUM/WATER 3 G/30 ML
SYRINGE (ML) INTRAVENOUS
Status: DISPENSED
Start: 2024-11-07

## (undated) RX ORDER — ACETAMINOPHEN 325 MG/1
TABLET ORAL
Status: DISPENSED
Start: 2024-11-07

## (undated) RX ORDER — OXYCODONE HYDROCHLORIDE 5 MG/1
TABLET ORAL
Status: DISPENSED
Start: 2024-11-07